# Patient Record
Sex: MALE | Race: WHITE | NOT HISPANIC OR LATINO | URBAN - METROPOLITAN AREA
[De-identification: names, ages, dates, MRNs, and addresses within clinical notes are randomized per-mention and may not be internally consistent; named-entity substitution may affect disease eponyms.]

---

## 2017-05-26 ENCOUNTER — IMPORTED ENCOUNTER (OUTPATIENT)
Dept: URBAN - METROPOLITAN AREA CLINIC 31 | Facility: CLINIC | Age: 82
End: 2017-05-26

## 2017-05-26 PROBLEM — H40.11X2: Noted: 2017-05-26

## 2017-05-26 PROCEDURE — 92083 EXTENDED VISUAL FIELD XM: CPT

## 2017-05-26 PROCEDURE — 99213 OFFICE O/P EST LOW 20 MIN: CPT

## 2017-05-26 NOTE — PATIENT DISCUSSION
1.  Primary Open Angle Glaucoma OU - stable VF 5/26/17. NO changes. Continue with current treatment plan including Alphagan P TID. Discussed importance of compliance. Will continue to monitor.    2.  RTN 12/17 CE/OCT with DR Lizzeth Sigala

## 2017-12-21 ENCOUNTER — IMPORTED ENCOUNTER (OUTPATIENT)
Dept: URBAN - METROPOLITAN AREA CLINIC 31 | Facility: CLINIC | Age: 82
End: 2017-12-21

## 2017-12-21 PROBLEM — H40.1132: Noted: 2017-12-21

## 2017-12-21 PROBLEM — H04.123: Noted: 2017-12-21

## 2017-12-21 PROCEDURE — 99214 OFFICE O/P EST MOD 30 MIN: CPT

## 2017-12-21 NOTE — PATIENT DISCUSSION
1.  Primary Open Angle Glaucoma OU - Continue with current treatment plan. Discussed importance of compliance. Will continue to monitor. 2.  Return for an appointment in 1 month for dilated fundus exam. VF 24-2. with Dr. Cabrera Messina. 3.  Dry Eyes OU:  Start artificials tears. Encouraged regular use.

## 2017-12-27 ENCOUNTER — IMPORTED ENCOUNTER (OUTPATIENT)
Dept: URBAN - METROPOLITAN AREA CLINIC 31 | Facility: CLINIC | Age: 82
End: 2017-12-27

## 2017-12-27 PROBLEM — Z96.1: Noted: 2017-12-27

## 2017-12-27 PROBLEM — H40.1113: Noted: 2017-12-27

## 2017-12-27 PROBLEM — H40.1121: Noted: 2017-12-27

## 2017-12-27 PROCEDURE — 99214 OFFICE O/P EST MOD 30 MIN: CPT

## 2017-12-27 PROCEDURE — 92083 EXTENDED VISUAL FIELD XM: CPT

## 2017-12-27 NOTE — PATIENT DISCUSSION
1.  Primary Open Angle Glaucoma OD - Suspect trabeculitis. Continue Alphagan and latanoprost but add Azopt TID OD and start acetazolamide 500 mg QD and Lotemax QID. If IOP not down then consult. 2. Primary open angle glaucoma OS - Continue with current treatment plan. Discussed importance of compliance. Will continue to monitor. 3.  Pseudophakia OU - IOLs stable. Monitor. 4. Return for an appointment in 1 day for pressure check. with Dr. Amy Roth.

## 2017-12-29 ENCOUNTER — IMPORTED ENCOUNTER (OUTPATIENT)
Dept: URBAN - METROPOLITAN AREA CLINIC 31 | Facility: CLINIC | Age: 82
End: 2017-12-29

## 2017-12-29 PROBLEM — Z96.1: Noted: 2017-12-29

## 2017-12-29 PROBLEM — H40.1121: Noted: 2017-12-29

## 2017-12-29 PROBLEM — H40.1113: Noted: 2017-12-29

## 2017-12-29 PROCEDURE — 99213 OFFICE O/P EST LOW 20 MIN: CPT

## 2017-12-29 NOTE — PATIENT DISCUSSION
1.  Primary open angle glaucoma OS with suspect trabeculitis - Continue with current treatment plan. Excellent response to treatment. Discussed importance of compliance. Will continue to monitor. 2.  Pseudophakia OU - IOLs stable. Monitor. 3. Return for an appointment in 1 week for pressure check. with Dr. Ant Mcwilliams.

## 2018-01-04 ENCOUNTER — IMPORTED ENCOUNTER (OUTPATIENT)
Dept: URBAN - METROPOLITAN AREA CLINIC 31 | Facility: CLINIC | Age: 83
End: 2018-01-04

## 2018-01-04 PROBLEM — H40.1121: Noted: 2018-01-04

## 2018-01-04 PROBLEM — Z96.1: Noted: 2018-01-04

## 2018-01-04 PROBLEM — H20.11: Noted: 2018-01-04

## 2018-01-04 PROBLEM — H40.1113: Noted: 2018-01-04

## 2018-01-04 PROCEDURE — 99213 OFFICE O/P EST LOW 20 MIN: CPT

## 2018-01-04 NOTE — PATIENT DISCUSSION
1.  Primary Open Angle Glaucoma OD - Compliance has not been very good and IOP has increased Alaphagan TID Azopt TID latanoprost QHS and acetazolamide 500 mg QD. Red Holt Continue with current treatment plan but decrease Lotemax to QHS. Discussed importance of compliance. Will continue to monitor. 2.  Pseudophakia OU - IOLs stable. Monitor. 3. Taper Lotemax to QD OD4. Return for an appointment in 1 week for pressure check with Dr. Jose Gonzales. If no improvement then consult. 5.   Refractive error

## 2018-01-16 ENCOUNTER — IMPORTED ENCOUNTER (OUTPATIENT)
Dept: URBAN - METROPOLITAN AREA CLINIC 31 | Facility: CLINIC | Age: 83
End: 2018-01-16

## 2018-01-16 PROBLEM — H40.1113: Noted: 2018-01-16

## 2018-01-16 PROCEDURE — 99213 OFFICE O/P EST LOW 20 MIN: CPT

## 2018-01-16 NOTE — PATIENT DISCUSSION
1.  Primary Open Angle Glaucoma OD - Continue with Alphagan TID Azopt BID latanoprost HS. DC Lotemax and acetazolamide. 2. Return for an appointment in 1 month for pressure check. with Dr. Clara Andino

## 2018-02-20 ENCOUNTER — IMPORTED ENCOUNTER (OUTPATIENT)
Dept: URBAN - METROPOLITAN AREA CLINIC 31 | Facility: CLINIC | Age: 83
End: 2018-02-20

## 2018-02-20 PROBLEM — H40.41X2: Noted: 2018-02-20

## 2018-02-20 PROBLEM — H40.41X3: Noted: 2018-02-20

## 2018-02-20 PROCEDURE — 99214 OFFICE O/P EST MOD 30 MIN: CPT

## 2018-02-20 NOTE — PATIENT DISCUSSION
1.  Inflammatory glaucoma (glaucomatocyclitic crisis) - continue present glaucoma drops. resume Lotemax TID OD. 2. Return for an appointment in 6 days for pressure check. with Dr. Marycarmen Baer

## 2018-02-27 ENCOUNTER — IMPORTED ENCOUNTER (OUTPATIENT)
Dept: URBAN - METROPOLITAN AREA CLINIC 31 | Facility: CLINIC | Age: 83
End: 2018-02-27

## 2018-02-27 PROBLEM — H40.41X2: Noted: 2018-02-27

## 2018-02-27 PROCEDURE — 99213 OFFICE O/P EST LOW 20 MIN: CPT

## 2018-02-27 NOTE — PATIENT DISCUSSION
1.  Inflammatory glaucoma - significant IOP decrease on Lotemax. Continue all present medications. 2. Return for an appointment in 2 weeks for pressure check. with Dr. Franky Quiroga.

## 2018-03-19 ENCOUNTER — IMPORTED ENCOUNTER (OUTPATIENT)
Dept: URBAN - METROPOLITAN AREA CLINIC 31 | Facility: CLINIC | Age: 83
End: 2018-03-19

## 2018-03-19 PROBLEM — H40.41X2: Noted: 2018-03-19

## 2018-03-19 PROCEDURE — 99213 OFFICE O/P EST LOW 20 MIN: CPT

## 2018-03-19 NOTE — PATIENT DISCUSSION
1.  Inflammatory glaucoma - good response to Lotemax with acceptable IOP today. Decrease Lotemax to QHS and continue other glaucoma meds. 2.  Return for an appointment in 6 weeks for pressure check. with Dr. Harris Frausto.

## 2018-04-30 ENCOUNTER — IMPORTED ENCOUNTER (OUTPATIENT)
Dept: URBAN - METROPOLITAN AREA CLINIC 31 | Facility: CLINIC | Age: 83
End: 2018-04-30

## 2018-04-30 PROBLEM — H40.41X3: Noted: 2018-04-30

## 2018-04-30 PROBLEM — Z96.1: Noted: 2018-04-30

## 2018-04-30 PROBLEM — H40.1121: Noted: 2018-04-30

## 2018-04-30 PROCEDURE — 92250 FUNDUS PHOTOGRAPHY W/I&R: CPT

## 2018-04-30 PROCEDURE — 99213 OFFICE O/P EST LOW 20 MIN: CPT

## 2018-04-30 NOTE — PATIENT DISCUSSION
1.  Primary open angle glaucoma OS mild - Continue with current treatment plan. Discussed importance of compliance. Will continue to monitor. 2.  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 Pseudophakia OU - IOLs stable. Monitor. 4. Return for an appointment in 3 months for comprehensive exam. VF 24-2. with Dr. Lori Miller.

## 2018-06-21 ENCOUNTER — IMPORTED ENCOUNTER (OUTPATIENT)
Dept: URBAN - METROPOLITAN AREA CLINIC 31 | Facility: CLINIC | Age: 83
End: 2018-06-21

## 2018-06-21 PROBLEM — T85.22XD: Noted: 2018-06-21

## 2018-06-21 PROBLEM — H40.89: Noted: 2018-06-21

## 2018-06-21 PROCEDURE — 99214 OFFICE O/P EST MOD 30 MIN: CPT

## 2018-06-21 PROCEDURE — 92020 GONIOSCOPY: CPT

## 2018-06-21 NOTE — PATIENT DISCUSSION
1. 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 Discussed with the patient. Continue with current treatment plan given good IOP. HOwever if recirrent inflammation or iop SPIKES - MIGHT NEED TO CONSIDER SURGICAL TREATMENT WITH IOL/HAPTIC REPOSITIONING/AMPUTATION OF THE HAPTIC. Discussed importance of compliance. Will continue to monitor. 2.  Reviewed with patient that lens has shifted. Options of observation versus surgical reposition/exchange discussed. Return for an appointment in 3 months for dilated fundus exam. VF test. IOP check with Dr. Goldie Long.

## 2018-06-21 NOTE — PATIENT DISCUSSION
Reviewed with patient that lens has shifted. Options of observation versus surgical reposition/exchange discussed.

## 2018-10-23 ENCOUNTER — IMPORTED ENCOUNTER (OUTPATIENT)
Dept: URBAN - METROPOLITAN AREA CLINIC 31 | Facility: CLINIC | Age: 83
End: 2018-10-23

## 2018-10-23 PROCEDURE — 92285 EXTERNAL OCULAR PHOTOGRAPHY: CPT

## 2018-10-23 PROCEDURE — 92083 EXTENDED VISUAL FIELD XM: CPT

## 2018-10-23 PROCEDURE — 99213 OFFICE O/P EST LOW 20 MIN: CPT

## 2018-10-23 NOTE — PATIENT DISCUSSION
Primary open angle glaucoma OS mild - Continue with current treatment plan. Discussed importance of compliance. Will continue to monitor.

## 2018-10-23 NOTE — PATIENT DISCUSSION
Steroid Induced Glaucoma OD:  Discussed that intraocular pressure is elevated due to steroid medication. Continue present management.

## 2018-10-23 NOTE — PATIENT DISCUSSION
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 Discussed RBA's with the patient in depth. discussed medical treatment alone which will eventually lead to blindness from glaucoma within months to few years. Discussed IOL exchange with a possible slowdown in glaucoma if uveitis and IOP is better controlled as a result. But also discussed a likelihood of complications and vision loss OD. He wants to proceed with surgery. Schedule OD IOL exchange with scleral fixation of IOL and planned anterior vitrectomy. Subtenon block by me when draped.

## 2018-10-26 PROBLEM — H40.1121: Noted: 2018-10-26

## 2018-10-26 PROBLEM — H40.041: Noted: 2018-10-26

## 2018-10-26 PROBLEM — T85.22XD: Noted: 2018-10-23

## 2018-10-26 PROBLEM — Z96.1: Noted: 2018-10-23

## 2018-11-01 ENCOUNTER — IMPORTED ENCOUNTER (OUTPATIENT)
Dept: URBAN - METROPOLITAN AREA CLINIC 31 | Facility: CLINIC | Age: 83
End: 2018-11-01

## 2018-11-01 PROBLEM — T85.22XD: Noted: 2018-11-01

## 2018-11-01 PROCEDURE — 76519 ECHO EXAM OF EYE: CPT

## 2018-11-15 ENCOUNTER — IMPORTED ENCOUNTER (OUTPATIENT)
Dept: URBAN - METROPOLITAN AREA CLINIC 31 | Facility: CLINIC | Age: 83
End: 2018-11-15

## 2018-11-15 PROBLEM — Z98.89: Noted: 2018-11-15

## 2018-11-15 PROCEDURE — 99024 POSTOP FOLLOW-UP VISIT: CPT

## 2018-11-15 NOTE — PATIENT DISCUSSION
Post Operative: S/P IOL exchange with scleral sutured IOL w/ mild Vitreous Heme from spill out blood from  conjuctiva during surgery. one conjunctival vicryl suture temporally trimmedDoing well po drops as instructed. tears prn Call with any problems. F/u Monday or tuesday.

## 2018-11-19 ENCOUNTER — IMPORTED ENCOUNTER (OUTPATIENT)
Dept: URBAN - METROPOLITAN AREA CLINIC 31 | Facility: CLINIC | Age: 83
End: 2018-11-19

## 2018-11-19 PROBLEM — Z98.89: Noted: 2018-11-19

## 2018-11-19 PROCEDURE — 99024 POSTOP FOLLOW-UP VISIT: CPT

## 2018-11-19 NOTE — PATIENT DISCUSSION
Post Operative:  S/P IOL exchange with scleral sutured IOL w/ mild Vitreous Heme from spill out blood from  conjuctival during surgery. Conjunctival vicryl suture temporally trimmed today. Return for an appointment in 2 weeks for post op exam. with Dr. Clint Monroy.

## 2018-12-05 ENCOUNTER — IMPORTED ENCOUNTER (OUTPATIENT)
Dept: URBAN - METROPOLITAN AREA CLINIC 31 | Facility: CLINIC | Age: 83
End: 2018-12-05

## 2018-12-05 PROBLEM — Z96.1: Noted: 2018-12-05

## 2018-12-05 PROCEDURE — 99024 POSTOP FOLLOW-UP VISIT: CPT

## 2018-12-05 NOTE — PATIENT DISCUSSION
Post-Op Cataract Surgery 15-90 days Left Eye (OS)- IOL exchange -due to UHG syndrome  Scleral sutured IOL Doing well with stable vision. vitreous heme cleared.  good IOP

## 2019-01-07 ENCOUNTER — IMPORTED ENCOUNTER (OUTPATIENT)
Dept: URBAN - METROPOLITAN AREA CLINIC 31 | Facility: CLINIC | Age: 84
End: 2019-01-07

## 2019-01-07 PROBLEM — Z96.1: Noted: 2019-01-07

## 2019-01-07 PROCEDURE — 99024 POSTOP FOLLOW-UP VISIT: CPT

## 2019-01-07 NOTE — PATIENT DISCUSSION
Post-Op Cataract Surgery 15-90 days Right Eye (OD):  Doing well with stable vision. Goretex surtures - exoposure. 3 10-0  suture removed today superior limbus. THree remaining superior  corneal sutures removedReturn for an appointment in 1 month for post op refraction. with Dr. Jc Us.

## 2019-02-07 ENCOUNTER — IMPORTED ENCOUNTER (OUTPATIENT)
Dept: URBAN - METROPOLITAN AREA CLINIC 31 | Facility: CLINIC | Age: 84
End: 2019-02-07

## 2019-02-07 PROBLEM — Z98.89: Noted: 2019-02-07

## 2019-02-07 PROCEDURE — 99024 POSTOP FOLLOW-UP VISIT: CPT

## 2019-02-07 PROCEDURE — 92015 DETERMINE REFRACTIVE STATE: CPT

## 2019-02-07 NOTE — PATIENT DISCUSSION
Post Operative: post scleral sutured IOL. Use Besivance qhs or Erythro ointment. Advanced POAG with uveitis component due to UGH syndrome. Resolved IOp and uveitis post IOL exchange/ scleral sutured IOLReturn for an appointment in 3 months for office call. with Dr. Ayleen Hummel.

## 2019-05-08 ENCOUNTER — IMPORTED ENCOUNTER (OUTPATIENT)
Dept: URBAN - METROPOLITAN AREA CLINIC 31 | Facility: CLINIC | Age: 84
End: 2019-05-08

## 2019-05-08 PROBLEM — Z96.1: Noted: 2019-05-08

## 2019-05-08 PROCEDURE — 99213 OFFICE O/P EST LOW 20 MIN: CPT

## 2019-05-08 NOTE — PATIENT DISCUSSION
Pseudophakia OD scleral sutured. remnant of fibrotic capsule on IOL - IOL stable. Sutures removed today 1 drop of Besivance to OD today. Use Antibiotic QIDX 2 DAYS.  f?u 6 MONTHS

## 2019-11-06 ENCOUNTER — IMPORTED ENCOUNTER (OUTPATIENT)
Dept: URBAN - METROPOLITAN AREA CLINIC 31 | Facility: CLINIC | Age: 84
End: 2019-11-06

## 2019-11-06 PROBLEM — H40.041: Noted: 2019-11-06

## 2019-11-06 PROBLEM — Z96.1: Noted: 2019-11-06

## 2019-11-06 PROBLEM — H40.1121: Noted: 2019-11-06

## 2019-11-06 PROCEDURE — 99214 OFFICE O/P EST MOD 30 MIN: CPT

## 2020-03-12 ENCOUNTER — IMPORTED ENCOUNTER (OUTPATIENT)
Dept: URBAN - METROPOLITAN AREA CLINIC 31 | Facility: CLINIC | Age: 85
End: 2020-03-12

## 2020-03-12 PROBLEM — H40.041: Noted: 2020-03-12

## 2020-03-12 PROCEDURE — 99213 OFFICE O/P EST LOW 20 MIN: CPT

## 2020-03-12 NOTE — PATIENT DISCUSSION
Steroid Induced Glaucoma OD:  Discussed that intraocular pressure is elevated due to steroid medication. Continue present management. Sclera sutured IOL. DIscussed need for dilation OSCE 6 months and IOP check

## 2020-12-10 ENCOUNTER — IMPORTED ENCOUNTER (OUTPATIENT)
Dept: URBAN - METROPOLITAN AREA CLINIC 31 | Facility: CLINIC | Age: 85
End: 2020-12-10

## 2020-12-10 PROBLEM — H40.1121: Noted: 2020-12-10

## 2020-12-10 PROBLEM — H40.041: Noted: 2020-12-10

## 2020-12-10 PROBLEM — H33.051: Noted: 2020-12-10

## 2020-12-10 PROCEDURE — 65222 REMOVE FOREIGN BODY FROM EYE: CPT

## 2020-12-10 PROCEDURE — 99214 OFFICE O/P EST MOD 30 MIN: CPT

## 2020-12-10 NOTE — PATIENT DISCUSSION
1.  Exposed Gortex  Right eye. Informed consent obtained. Suture removed in office under microscope and with Betadine 10% solution before and after procedure. Inferior suture removed Superior suture trimmed. 2. Retinal Detachment OD- Total RD discussed. Likely present for months. Given NLP vision likelihood of post surgical improvement is low. I offered retina referral patient declined. History of end stage glaucoma. 3.  Steroid Induced Glaucoma OD:  Discussed that intraocular pressure is elevated due to steroid medication. Continue with current treatment plan. Discussed importance of compliance. Will continue to monitor for stability or progression. 4. Primary open angle glaucoma OS mild -  Continue with current treatment plan. Discussed importance of compliance. Will continue to monitor for stability or progression. 5.  Return for an appointment in 1 week for office call. with Dr. Jian Kent.

## 2020-12-10 NOTE — PATIENT DISCUSSION
Retinal Detachment OD- Total RD discussed. Likely present for months. Given NLP vision likelihood of post surgical improvement is low. I offered retina referral patient declined. History of end stage glaucoma.

## 2020-12-10 NOTE — PATIENT DISCUSSION
Steroid Induced Glaucoma OD:  Discussed that intraocular pressure is elevated due to steroid medication. Continue with current treatment plan. Discussed importance of compliance. Will continue to monitor for stability or progression.

## 2020-12-17 ENCOUNTER — IMPORTED ENCOUNTER (OUTPATIENT)
Dept: URBAN - METROPOLITAN AREA CLINIC 31 | Facility: CLINIC | Age: 85
End: 2020-12-17

## 2020-12-17 PROBLEM — H40.041: Noted: 2020-12-17

## 2020-12-17 PROBLEM — H40.1121: Noted: 2020-12-17

## 2020-12-17 PROBLEM — H33.051: Noted: 2020-12-17

## 2020-12-17 PROCEDURE — 99213 OFFICE O/P EST LOW 20 MIN: CPT

## 2020-12-17 PROCEDURE — 99080 SPECIAL REPORTS OR FORMS: CPT

## 2020-12-17 NOTE — PATIENT DISCUSSION
1.  S/p removal of Exposed Gortex  Right eye.doing well. Blind eye dueto total RD ( end stage POAG as well)2. Retinal Detachment OD- Total RD discussed. Likely present for months. Given NLP vision likelihood of post surgical improvement is low. I offered retina referral patient declined. History of end stage glaucoma. 3.  Steroid Induced Glaucoma OD:  Discussed that intraocular pressure is elevated due to steroid medication. Continue with current treatment plan. Discussed importance of compliance. Will continue to monitor for stability or progression. 4. Primary open angle glaucoma OS mild -  Continue with current treatment plan. Discussed importance of compliance. Will continue to monitor for stability or progression.  5.  Return for an appointment in 6 months DF

## 2021-03-11 ENCOUNTER — IMPORTED ENCOUNTER (OUTPATIENT)
Dept: URBAN - METROPOLITAN AREA CLINIC 31 | Facility: CLINIC | Age: 86
End: 2021-03-11

## 2021-03-11 PROBLEM — H04.213: Noted: 2021-03-11

## 2021-03-11 PROBLEM — H10.403: Noted: 2021-03-11

## 2021-03-11 PROCEDURE — 99213 OFFICE O/P EST LOW 20 MIN: CPT

## 2021-03-11 NOTE — PATIENT DISCUSSION
1.  Allergic Conjunctivitis OU -- The condition was  discussed with the patient. Avoidance of allergens and cool compresses were recommended. 2. Epiphora OU - The patient is complaining of excess lacrimation of both eyes. Because it is relatively mild and not constant no diagnostic or therapeutic intervention was recommended at this time. recommended OTC allergy dropsOn schedule

## 2021-03-11 NOTE — PATIENT DISCUSSION
Epiphora OU - The patient is complaining of excess lacrimation of both eyes. Because it is relatively mild and not constant no diagnostic or therapeutic intervention was recommended at this time.

## 2021-08-24 ENCOUNTER — IMPORTED ENCOUNTER (OUTPATIENT)
Dept: URBAN - METROPOLITAN AREA CLINIC 31 | Facility: CLINIC | Age: 86
End: 2021-08-24

## 2021-08-24 PROBLEM — H04.213: Noted: 2021-08-24

## 2021-08-24 PROBLEM — H10.401: Noted: 2021-08-24

## 2021-08-24 PROBLEM — H10.403: Noted: 2021-08-24

## 2021-08-24 PROBLEM — H40.1121: Noted: 2021-08-24

## 2021-08-24 PROCEDURE — 99213 OFFICE O/P EST LOW 20 MIN: CPT

## 2021-08-24 NOTE — PATIENT DISCUSSION
Primary open angle glaucoma OS mild -  Continue with current treatment plan - likely on Latanoprost - unclear - the patient is a poor historian. Will check VF OS only in 1 week  and the patient to bring his eyedrops

## 2021-08-24 NOTE — PATIENT DISCUSSION
1.  Epiphora OU - The patient is complaining of excess lacrimation of both eyes. Because it is relatively mild and not constant no diagnostic or therapeutic intervention was recommended at this time. Start Erythromycin joaquin ou. BID2. Primary open angle glaucoma OS mild -  Continue with current treatment plan - likely on Latanoprost - unclear - the patient is a poor historian. Will check VF OS only in 1 week  and the patient to bring his eyedrops

## 2021-09-16 ENCOUNTER — IMPORTED ENCOUNTER (OUTPATIENT)
Dept: URBAN - METROPOLITAN AREA CLINIC 31 | Facility: CLINIC | Age: 86
End: 2021-09-16

## 2021-09-16 PROBLEM — H04.213: Noted: 2021-09-16

## 2021-09-16 PROBLEM — H40.1121: Noted: 2021-09-16

## 2021-09-16 PROBLEM — H10.403: Noted: 2021-09-16

## 2021-09-16 PROBLEM — INACTIVE: Noted: 2021-09-16

## 2021-09-16 PROCEDURE — 92083 EXTENDED VISUAL FIELD XM: CPT

## 2021-09-16 PROCEDURE — 99213 OFFICE O/P EST LOW 20 MIN: CPT

## 2021-09-16 NOTE — PATIENT DISCUSSION
1.  Epiphora OU - The patient is complaining of excess lacrimation of both eyes. Because it is relatively mild and not constant no diagnostic or therapeutic intervention was recommended at this time. Continue Erythromycin joaquin ou. BID2. Primary open angle glaucoma OS mild -  Continue with current treatment plan - likely on Latanoprost - unclear - the patient is a poor historian. Return for an appointment in 6 months with Dr. Shanta Hancock at [facility]. Yo for Pressure check.

## 2022-04-02 ASSESSMENT — VISUAL ACUITY
OS_SC: 20/25+2
OS_CC: J1+
OD_PH: CC 20/30 -2
OS_SC: 20/20-1
OS_SC: 20/20-1
OD_SC: 20/25-2
OS_SC: 20/20-2
OS_SC: 20/25
OD_SC: 20/30
OD_PH: CC 20/25
OD_SC: 20/50+2
OS_CC: 20/20
OS_CC: 20/25-2
OD_CC: 20/200
OD_CC: 20/400
OD_PH: CC 20/30
OS_SC: 20/25
OD_SC: 20/40-2
OS_SC: 20/20-2
OU_CC: J1+
OD_SC: 20/30
OD_SC: 20/40+1
OS_SC: 20/25
OS_SC: 20/20
OD_SC: 20/25-2
OD_PH: CC 20/70
OS_SC: 20/20-1
OD_CC: 20/400
OS_SC: 20/20
OS_SC: 20/25+2
OD_SC: 20/30
OD_SC: 20/50-2
OS_SC: 20/20
OS_SC: 20/25-1
OS_SC: 20/20
OS_SC: 20/20-2
OS_CC: J1+
OD_CC: J4
OD_PH: SC 20/40 -2

## 2022-04-02 ASSESSMENT — TONOMETRY
OD_IOP_MMHG: 18
OS_IOP_MMHG: 14
OD_IOP_MMHG: 14
OD_IOP_MMHG: 10
OS_IOP_MMHG: 13
OD_IOP_MMHG: 0
OS_IOP_MMHG: 13
OD_IOP_MMHG: 15
OD_IOP_MMHG: 16
OD_IOP_MMHG: 15
OD_IOP_MMHG: 14
OS_IOP_MMHG: 10
OS_IOP_MMHG: 11
OD_IOP_MMHG: 27
OD_IOP_MMHG: 26
OD_IOP_MMHG: 13
OS_IOP_MMHG: 15
OD_IOP_MMHG: 10
OS_IOP_MMHG: 10
OD_IOP_MMHG: 31
OS_IOP_MMHG: 13
OS_IOP_MMHG: 14
OS_IOP_MMHG: 13
OS_IOP_MMHG: 16
OD_IOP_MMHG: 14
OS_IOP_MMHG: 15
OD_IOP_MMHG: 20
OD_IOP_MMHG: 13
OD_IOP_MMHG: 10
OS_IOP_MMHG: 10
OS_IOP_MMHG: 13
OD_IOP_MMHG: 52
OD_IOP_MMHG: 0
OD_IOP_MMHG: 21
OS_IOP_MMHG: 15
OD_IOP_MMHG: 12
OD_IOP_MMHG: 15
OS_IOP_MMHG: 10
OS_IOP_MMHG: 14
OS_IOP_MMHG: 13
OD_IOP_MMHG: 15

## 2022-07-09 ENCOUNTER — TELEPHONE ENCOUNTER (OUTPATIENT)
Dept: URBAN - METROPOLITAN AREA CLINIC 121 | Facility: CLINIC | Age: 87
End: 2022-07-09

## 2022-07-10 ENCOUNTER — TELEPHONE ENCOUNTER (OUTPATIENT)
Dept: URBAN - METROPOLITAN AREA CLINIC 121 | Facility: CLINIC | Age: 87
End: 2022-07-10

## 2022-07-10 RX ORDER — ASPIRIN 81 MG/1
TABLET, COATED ORAL
Refills: 0 | Status: ACTIVE | COMMUNITY
Start: 2010-12-14

## 2022-07-21 ENCOUNTER — PREPPED CHART (OUTPATIENT)
Dept: URBAN - METROPOLITAN AREA CLINIC 29 | Facility: CLINIC | Age: 87
End: 2022-07-21

## 2022-07-21 NOTE — PATIENT DISCUSSION
1.  Epiphora OU - The patient is complaining of excess lacrimation of both eyes. Because it is relatively mild and not constant no diagnostic or therapeutic intervention was recommended at this time. Continue Erythromycin joaquin ou. BID2. Primary open angle glaucoma OS mild -  Continue with current treatment plan - likely on Latanoprost - unclear - the patient is a poor historian. Return for an appointment in 6 months with Dr. Jayleen Sheehan at [facility]. Yo for Pressure check.

## 2022-07-26 ENCOUNTER — ESTABLISHED PATIENT (OUTPATIENT)
Dept: URBAN - METROPOLITAN AREA CLINIC 29 | Facility: CLINIC | Age: 87
End: 2022-07-26

## 2022-07-26 DIAGNOSIS — H40.1121: ICD-10-CM

## 2022-07-26 PROCEDURE — 99214 OFFICE O/P EST MOD 30 MIN: CPT

## 2022-07-26 ASSESSMENT — TONOMETRY
OD_IOP_MMHG: 5
OS_IOP_MMHG: 13

## 2022-07-26 ASSESSMENT — VISUAL ACUITY: OS_CC: 20/20-2

## 2022-07-26 NOTE — PATIENT DISCUSSION
1.  Epiphora OU - The patient is complaining of excess lacrimation of both eyes. Because it is relatively mild and not constant no diagnostic or therapeutic intervention was recommended at this time. Continue Erythromycin joaquin ou. BID2. Primary open angle glaucoma OS mild -  Continue with current treatment plan - likely on Latanoprost - unclear - the patient is a poor historian. Return for an appointment in 6 months with Dr. Bunny Pro at [facility]. Yo for Pressure check.

## 2022-07-26 NOTE — PATIENT DISCUSSION
Patient is complaining of excess lacrimation of both eyes. Because it is relatively mild and not constant no diagnostic or therapeutic intervention was recommended at this time.